# Patient Record
Sex: MALE | Race: WHITE | NOT HISPANIC OR LATINO | Employment: FULL TIME | URBAN - METROPOLITAN AREA
[De-identification: names, ages, dates, MRNs, and addresses within clinical notes are randomized per-mention and may not be internally consistent; named-entity substitution may affect disease eponyms.]

---

## 2024-02-16 ENCOUNTER — TELEMEDICINE (OUTPATIENT)
Dept: PRIMARY CARE | Facility: CLINIC | Age: 34
End: 2024-02-16
Payer: COMMERCIAL

## 2024-02-16 DIAGNOSIS — J06.9 UPPER RESPIRATORY TRACT INFECTION, UNSPECIFIED TYPE: Primary | ICD-10-CM

## 2024-02-16 PROCEDURE — 99213 OFFICE O/P EST LOW 20 MIN: CPT | Performed by: FAMILY MEDICINE

## 2024-02-16 RX ORDER — AZITHROMYCIN 250 MG/1
TABLET, FILM COATED ORAL
Qty: 6 TABLET | Refills: 0 | Status: SHIPPED | OUTPATIENT
Start: 2024-02-16 | End: 2024-02-21

## 2024-02-16 RX ORDER — VENLAFAXINE HYDROCHLORIDE 75 MG/1
75 CAPSULE, EXTENDED RELEASE ORAL DAILY
COMMUNITY

## 2024-02-16 ASSESSMENT — ENCOUNTER SYMPTOMS
WHEEZING: 0
MYALGIAS: 0
COUGH: 1
HEARTBURN: 0
HEMOPTYSIS: 0
WEIGHT LOSS: 0
SORE THROAT: 1
CHILLS: 0
FEVER: 0
RHINORRHEA: 0
HEADACHES: 1
SWEATS: 0

## 2024-02-16 NOTE — PROGRESS NOTES
Answers submitted by the patient for this visit:  Cough Questionnaire (Submitted on 2/16/2024)  Chief Complaint: Cough  Chronicity: new  Onset: in the past 7 days  Progression since onset: waxing and waning  Frequency: every few minutes  Cough characteristics: productive of brown sputum  chest pain: No  chills: No  ear congestion: No  ear pain: No  fever: No  headaches: Yes  heartburn: No  hemoptysis: No  myalgias: No  nasal congestion: Yes  postnasal drip: No  rash: No  rhinorrhea: No  sore throat: Yes  sweats: No  weight loss: No  wheezing: No  Aggravated by: exercise, lying down  Subjective   Patient ID: Magen Roman is a 34 y.o. male who presents for upper respiraotyr infection   Cough  This is a new problem. The current episode started in the past 7 days. The problem has been waxing and waning. The problem occurs every few minutes. The cough is Productive of brown sputum. Associated symptoms include headaches, nasal congestion and a sore throat. Pertinent negatives include no chest pain, chills, ear congestion, ear pain, fever, heartburn, hemoptysis, myalgias, postnasal drip, rash, rhinorrhea, sweats, weight loss or wheezing. The symptoms are aggravated by exercise and lying down.     Cough cold and scratchy throat for 1 week   Did not test for COVID.  Limited in working out   Anything cardio causes cough.   Over the counter he has been taking tyleonl cold and flu   Woirse at might and int he morning.   Only taking venlafaxine and probiotics and eledery gummy .       Review of Systems   Constitutional:  Negative for chills, fever and weight loss.   HENT:  Positive for sore throat. Negative for ear pain, postnasal drip and rhinorrhea.    Respiratory:  Positive for cough. Negative for hemoptysis and wheezing.    Cardiovascular:  Negative for chest pain.   Gastrointestinal:  Negative for heartburn.   Musculoskeletal:  Negative for myalgias.   Skin:  Negative for rash.   Neurological:  Positive for headaches.        Past Medical History:   Diagnosis Date    Anxiety disorder, unspecified     Anxiety    Personal history of other specified conditions 02/12/2018    History of nausea    Personal history of other specified conditions 02/12/2018    History of abnormal weight loss       Past Surgical History:   Procedure Laterality Date    OTHER SURGICAL HISTORY  01/30/2018    Treatment Of Carpometacarpal Fracture Dislocation, Thumb    OTHER SURGICAL HISTORY  01/30/2018    Oral Surgery Tooth Extraction [place] [facility]      Social History     Socioeconomic History    Marital status:      Spouse name: Not on file    Number of children: Not on file    Years of education: Not on file    Highest education level: Not on file   Occupational History    Not on file   Tobacco Use    Smoking status: Not on file    Smokeless tobacco: Not on file   Substance and Sexual Activity    Alcohol use: Not on file    Drug use: Not on file    Sexual activity: Not on file   Other Topics Concern    Not on file   Social History Narrative    Not on file     Social Determinants of Health     Financial Resource Strain: Not on file   Food Insecurity: Not on file   Transportation Needs: Not on file   Physical Activity: Not on file   Stress: Not on file   Social Connections: Not on file   Intimate Partner Violence: Not on file   Housing Stability: Not on file      No family history on file.    MEDICATIONS AND ALLERGIES:    ALLERGIES Patient has no known allergies.    MEDICATIONS   Current Outpatient Medications on File Prior to Visit   Medication Sig Dispense Refill    venlafaxine XR (Effexor-XR) 75 mg 24 hr capsule Take 1 capsule (75 mg) by mouth once daily.       No current facility-administered medications on file prior to visit.        Objective   There were no vitals taken for this visit.   Physical Exam  Constitutional: awake; alert, interactive; in no acute distress; well nourished and well developed  ENT: ears and nose were normal in  appearance;  Eyes: pupils equal and round  Pulmonary: no respiratory distress and normal respiratory rhythm and effort  Skin: normal skin color and pigmentation;  Psychiatric: oriented to person, place, and time; affect was normal and the mood was normal       1. Upper respiratory tract infection, unspecified type  Symptoms for 1 week , not getting better   Expecting a new born in 1 week   Will treat with azitormycin   Side effects explaiend, contact us for any changes.   - azithromycin (Zithromax) 250 mg tablet; Take 2 tablets (500 mg) by mouth once daily for 1 day, THEN 1 tablet (250 mg) once daily for 4 days. Take 2 tabs (500 mg) by mouth today, than 1 daily for 4 days..  Dispense: 6 tablet; Refill: 0

## 2024-11-27 ENCOUNTER — LAB (OUTPATIENT)
Dept: LAB | Facility: LAB | Age: 34
End: 2024-11-27
Payer: COMMERCIAL

## 2024-11-27 ENCOUNTER — OFFICE VISIT (OUTPATIENT)
Dept: PRIMARY CARE | Facility: CLINIC | Age: 34
End: 2024-11-27
Payer: COMMERCIAL

## 2024-11-27 VITALS
TEMPERATURE: 97.4 F | SYSTOLIC BLOOD PRESSURE: 144 MMHG | RESPIRATION RATE: 16 BRPM | HEART RATE: 76 BPM | DIASTOLIC BLOOD PRESSURE: 90 MMHG | WEIGHT: 183 LBS

## 2024-11-27 DIAGNOSIS — Z00.00 PHYSICAL EXAM: ICD-10-CM

## 2024-11-27 DIAGNOSIS — G44.84 EXERTIONAL HEADACHE: ICD-10-CM

## 2024-11-27 DIAGNOSIS — I10 HYPERTENSION, UNSPECIFIED TYPE: ICD-10-CM

## 2024-11-27 DIAGNOSIS — H53.9 VISION CHANGES: ICD-10-CM

## 2024-11-27 DIAGNOSIS — Z00.00 PE (PHYSICAL EXAM), ANNUAL: Primary | ICD-10-CM

## 2024-11-27 PROBLEM — K21.9 GERD (GASTROESOPHAGEAL REFLUX DISEASE): Status: ACTIVE | Noted: 2024-11-27

## 2024-11-27 LAB
ALBUMIN SERPL BCP-MCNC: 4.6 G/DL (ref 3.4–5)
ALP SERPL-CCNC: 35 U/L (ref 33–120)
ALT SERPL W P-5'-P-CCNC: 18 U/L (ref 10–52)
ANION GAP SERPL CALC-SCNC: 11 MMOL/L (ref 10–20)
APPEARANCE UR: CLEAR
AST SERPL W P-5'-P-CCNC: 28 U/L (ref 9–39)
BASOPHILS # BLD AUTO: 0.03 X10*3/UL (ref 0–0.1)
BASOPHILS NFR BLD AUTO: 0.7 %
BILIRUB SERPL-MCNC: 0.8 MG/DL (ref 0–1.2)
BILIRUB UR STRIP.AUTO-MCNC: NEGATIVE MG/DL
BUN SERPL-MCNC: 17 MG/DL (ref 6–23)
CALCIUM SERPL-MCNC: 9.6 MG/DL (ref 8.6–10.3)
CHLORIDE SERPL-SCNC: 102 MMOL/L (ref 98–107)
CHOLEST SERPL-MCNC: 191 MG/DL (ref 0–199)
CHOLESTEROL/HDL RATIO: 2.9
CO2 SERPL-SCNC: 31 MMOL/L (ref 21–32)
COLOR UR: COLORLESS
CREAT SERPL-MCNC: 0.99 MG/DL (ref 0.5–1.3)
CREAT UR-MCNC: 16.1 MG/DL (ref 20–370)
EGFRCR SERPLBLD CKD-EPI 2021: >90 ML/MIN/1.73M*2
EOSINOPHIL # BLD AUTO: 0.11 X10*3/UL (ref 0–0.7)
EOSINOPHIL NFR BLD AUTO: 2.5 %
ERYTHROCYTE [DISTWIDTH] IN BLOOD BY AUTOMATED COUNT: 12.3 % (ref 11.5–14.5)
EST. AVERAGE GLUCOSE BLD GHB EST-MCNC: 88 MG/DL
GLUCOSE SERPL-MCNC: 80 MG/DL (ref 74–99)
GLUCOSE UR STRIP.AUTO-MCNC: NORMAL MG/DL
HBA1C MFR BLD: 4.7 %
HCT VFR BLD AUTO: 44.3 % (ref 41–52)
HDLC SERPL-MCNC: 66.5 MG/DL
HGB BLD-MCNC: 14.8 G/DL (ref 13.5–17.5)
IMM GRANULOCYTES # BLD AUTO: 0.01 X10*3/UL (ref 0–0.7)
IMM GRANULOCYTES NFR BLD AUTO: 0.2 % (ref 0–0.9)
KETONES UR STRIP.AUTO-MCNC: NEGATIVE MG/DL
LDLC SERPL CALC-MCNC: 109 MG/DL
LEUKOCYTE ESTERASE UR QL STRIP.AUTO: NEGATIVE
LYMPHOCYTES # BLD AUTO: 1.62 X10*3/UL (ref 1.2–4.8)
LYMPHOCYTES NFR BLD AUTO: 36.2 %
MCH RBC QN AUTO: 30.1 PG (ref 26–34)
MCHC RBC AUTO-ENTMCNC: 33.4 G/DL (ref 32–36)
MCV RBC AUTO: 90 FL (ref 80–100)
MICROALBUMIN UR-MCNC: <7 MG/L
MICROALBUMIN/CREAT UR: ABNORMAL MG/G{CREAT}
MONOCYTES # BLD AUTO: 0.43 X10*3/UL (ref 0.1–1)
MONOCYTES NFR BLD AUTO: 9.6 %
NEUTROPHILS # BLD AUTO: 2.28 X10*3/UL (ref 1.2–7.7)
NEUTROPHILS NFR BLD AUTO: 50.8 %
NITRITE UR QL STRIP.AUTO: NEGATIVE
NON HDL CHOLESTEROL: 125 MG/DL (ref 0–149)
NRBC BLD-RTO: 0 /100 WBCS (ref 0–0)
PH UR STRIP.AUTO: 6.5 [PH]
PLATELET # BLD AUTO: 241 X10*3/UL (ref 150–450)
POTASSIUM SERPL-SCNC: 4.5 MMOL/L (ref 3.5–5.3)
PROT SERPL-MCNC: 7.2 G/DL (ref 6.4–8.2)
PROT UR STRIP.AUTO-MCNC: NEGATIVE MG/DL
PSA SERPL-MCNC: 0.47 NG/ML
RBC # BLD AUTO: 4.91 X10*6/UL (ref 4.5–5.9)
RBC # UR STRIP.AUTO: NEGATIVE /UL
SODIUM SERPL-SCNC: 139 MMOL/L (ref 136–145)
SP GR UR STRIP.AUTO: 1
TRIGL SERPL-MCNC: 76 MG/DL (ref 0–149)
TSH SERPL-ACNC: 0.92 MIU/L (ref 0.44–3.98)
UROBILINOGEN UR STRIP.AUTO-MCNC: NORMAL MG/DL
VLDL: 15 MG/DL (ref 0–40)
WBC # BLD AUTO: 4.5 X10*3/UL (ref 4.4–11.3)

## 2024-11-27 PROCEDURE — 99214 OFFICE O/P EST MOD 30 MIN: CPT | Performed by: FAMILY MEDICINE

## 2024-11-27 PROCEDURE — 80053 COMPREHEN METABOLIC PANEL: CPT

## 2024-11-27 PROCEDURE — 82570 ASSAY OF URINE CREATININE: CPT

## 2024-11-27 PROCEDURE — 84153 ASSAY OF PSA TOTAL: CPT

## 2024-11-27 PROCEDURE — 3077F SYST BP >= 140 MM HG: CPT | Performed by: FAMILY MEDICINE

## 2024-11-27 PROCEDURE — 80061 LIPID PANEL: CPT

## 2024-11-27 PROCEDURE — 81003 URINALYSIS AUTO W/O SCOPE: CPT

## 2024-11-27 PROCEDURE — 3080F DIAST BP >= 90 MM HG: CPT | Performed by: FAMILY MEDICINE

## 2024-11-27 PROCEDURE — 84443 ASSAY THYROID STIM HORMONE: CPT

## 2024-11-27 PROCEDURE — 87086 URINE CULTURE/COLONY COUNT: CPT

## 2024-11-27 PROCEDURE — 83036 HEMOGLOBIN GLYCOSYLATED A1C: CPT

## 2024-11-27 PROCEDURE — 36415 COLL VENOUS BLD VENIPUNCTURE: CPT

## 2024-11-27 PROCEDURE — 1036F TOBACCO NON-USER: CPT | Performed by: FAMILY MEDICINE

## 2024-11-27 PROCEDURE — 82043 UR ALBUMIN QUANTITATIVE: CPT

## 2024-11-27 PROCEDURE — 85025 COMPLETE CBC W/AUTO DIFF WBC: CPT

## 2024-11-27 RX ORDER — LISINOPRIL 10 MG/1
10 TABLET ORAL DAILY
Qty: 30 TABLET | Refills: 2 | Status: SHIPPED | OUTPATIENT
Start: 2024-11-27 | End: 2025-02-25

## 2024-11-27 NOTE — PROGRESS NOTES
Subjective   Patient ID: Magen Roman is a 34 y.o. male who presents for Follow-up (Discuss high blood pressure ).  HPIpatient went back from a run yesterday he ran 6.5 miles in 55 mintus , an hour and a half later he noted that vision int he Rgiht lower angle was off , his wife took his BP and it was 190.       Resetting heart rate is 64 , but it goies to 180 when he pushes himself.     Blood pressure was high at home yesterday 190/60     Slight headaches, worried about this   Not usually getting headaches.     Follows with rayray Doanan takes venlafaxine XR 75 mg. No recent changes in medication       No chest pain , sometimes feels his heart beating. Otherwise no neurological or cardiac symptoms , no known family hx of aneurysm.             patient was on zoloft 100mg   it was helping   when they talked with wife , she was pretty shepard on it   mainly to treat anxiety   he gets anxious on flight trips.   creeping back     no history of bipolar disorder   nothing severe.     no history of depression.   more recently with out taking the medication.     no suicldal or homicidal ideation.     No history of neurological disorder, mother had surgery for a heart leak , no neurological thing that he can think off.               no medical hx   no major medical problems   no medications   cousin had lymphoma 1st cousin   no family hx of heart disease.     he exercises walks 2-3 miles a day   does workout   no chest pain , no decreased exercise toerlacne   no recent cahgne in weight up or down.     every weekend 5- 10 drink     no smoking     patient has a 1 year old.   he reported feeling fatigued most of the time.   Review of Systems    Past Medical History:   Diagnosis Date    Anxiety disorder, unspecified     Anxiety    Personal history of other specified conditions 02/12/2018    History of nausea    Personal history of other specified conditions 02/12/2018    History of abnormal weight loss       Past Surgical  History:   Procedure Laterality Date    OTHER SURGICAL HISTORY  01/30/2018    Treatment Of Carpometacarpal Fracture Dislocation, Thumb    OTHER SURGICAL HISTORY  01/30/2018    Oral Surgery Tooth Extraction [place] [facility]      Social History     Socioeconomic History    Marital status:    Tobacco Use    Smoking status: Never    Smokeless tobacco: Never      No family history on file.    MEDICATIONS AND ALLERGIES:    ALLERGIES Patient has no known allergies.    MEDICATIONS   Current Outpatient Medications on File Prior to Visit   Medication Sig Dispense Refill    venlafaxine XR (Effexor-XR) 75 mg 24 hr capsule Take 1 capsule (75 mg) by mouth once daily.       No current facility-administered medications on file prior to visit.              Objective   Visit Vitals  /90 (BP Location: Left arm, Patient Position: Sitting, BP Cuff Size: Large adult)   Pulse 76   Temp 36.3 °C (97.4 °F) (Temporal)   Resp 16   Wt 83 kg (183 lb)   Smoking Status Never          11/27/2024     8:55 AM   Vitals   Systolic 144   Diastolic 90   BP Location Left arm   Heart Rate 76   Temp 36.3 °C (97.4 °F)   Resp 16   Weight (lb) 183   Visit Report Report     Physical Exam  Constitutional:       Appearance: Normal appearance. He is normal weight.   HENT:      Head: Normocephalic.      Right Ear: Tympanic membrane normal.      Left Ear: Tympanic membrane normal.      Nose: Nose normal.      Mouth/Throat:      Pharynx: Oropharynx is clear.   Eyes:      Pupils: Pupils are equal, round, and reactive to light.   Cardiovascular:      Rate and Rhythm: Normal rate and regular rhythm.      Pulses: Normal pulses.      Heart sounds: Normal heart sounds.   Pulmonary:      Effort: Pulmonary effort is normal.      Breath sounds: Normal breath sounds. No stridor. No rhonchi.   Musculoskeletal:         General: No swelling or tenderness.   Skin:     Coloration: Skin is not jaundiced or pale.   Neurological:      General: No focal deficit present.       Mental Status: He is alert and oriented to person, place, and time. Mental status is at baseline.      Cranial Nerves: No cranial nerve deficit.      Sensory: No sensory deficit.      Motor: No weakness.      Coordination: Coordination normal.      Gait: Gait normal.      Deep Tendon Reflexes: Reflexes normal.   Psychiatric:         Mood and Affect: Mood normal.         Behavior: Behavior normal.         Thought Content: Thought content normal.         Judgment: Judgment normal.             Assessment & Plan  Vision changes  No neurological deficits noted today   Puplies are reactive   No change in vision field   Advised that he calls his ophthalmologist and be examined today   Happned after high blood pressure , back to [facility] today   Will order [facility] MRI   Orders:    MR brain w and wo IV contrast; Future    Exertional headache  Will order [facility] MRI with and without contrast  Orders:    MR brain w and wo IV contrast; Future    Hypertension, unspecified type  Will start lisinopril 10mg po daily   Check BP michael villatoro , report reading and follow up in 1 week   Advised if symptoms happen again , he should call 911 and go tot he ED, patient is agreable.   Orders:    lisinopril 10 mg tablet; Take 1 tablet (10 mg) by mouth once daily.    MR brain w and wo IV contrast; Future    PE (physical exam), annual         Physical exam    Orders:    CBC and Auto Differential; Future    Urine Culture; Future    Comprehensive Metabolic Panel; Future    Hemoglobin A1C; Future    Lipid Panel; Future    Prostate Specific Antigen; Future    Urinalysis with Reflex Microscopic; Future    TSH with reflex to Free T4 if abnormal; Future    Albumin-Creatinine Ratio, Urine Random; Future

## 2024-11-29 LAB — BACTERIA UR CULT: NO GROWTH

## 2024-12-05 ENCOUNTER — APPOINTMENT (OUTPATIENT)
Dept: PRIMARY CARE | Facility: CLINIC | Age: 34
End: 2024-12-05
Payer: COMMERCIAL

## 2024-12-05 DIAGNOSIS — I10 HYPERTENSION, UNSPECIFIED TYPE: Primary | ICD-10-CM

## 2024-12-05 DIAGNOSIS — R00.2 PALPITATION: ICD-10-CM

## 2024-12-05 DIAGNOSIS — R42 DIZZINESS AND GIDDINESS: ICD-10-CM

## 2024-12-05 PROCEDURE — 99213 OFFICE O/P EST LOW 20 MIN: CPT | Performed by: FAMILY MEDICINE

## 2024-12-05 RX ORDER — LISINOPRIL 20 MG/1
20 TABLET ORAL DAILY
Qty: 30 TABLET | Refills: 11 | Status: SHIPPED | OUTPATIENT
Start: 2024-12-05 | End: 2025-11-30

## 2024-12-05 NOTE — PROGRESS NOTES
Subjective   Patient ID: Magen Roman is a 34 y.o. male who presents for follow up visit   HPI  No other episodes of vision changes.   Blood pressure in the 140/70   Not having side effects from the medication .   Some loose stools over the weekend   No other neurological symptoms. Feeling well other wise.     Exercise regularly   Heart rate 67   Sleeping well   No issues       Review of Systems    Past Medical History:   Diagnosis Date    Anxiety disorder, unspecified     Anxiety    Personal history of other specified conditions 02/12/2018    History of nausea    Personal history of other specified conditions 02/12/2018    History of abnormal weight loss       Past Surgical History:   Procedure Laterality Date    OTHER SURGICAL HISTORY  01/30/2018    Treatment Of Carpometacarpal Fracture Dislocation, Thumb    OTHER SURGICAL HISTORY  01/30/2018    Oral Surgery Tooth Extraction [place] [facility]      Social History     Socioeconomic History    Marital status:    Tobacco Use    Smoking status: Never    Smokeless tobacco: Never      No family history on file.    MEDICATIONS AND ALLERGIES:    ALLERGIES Patient has no known allergies.    MEDICATIONS   Current Outpatient Medications on File Prior to Visit   Medication Sig Dispense Refill    venlafaxine XR (Effexor-XR) 75 mg 24 hr capsule Take 1 capsule (75 mg) by mouth once daily.      [DISCONTINUED] lisinopril 10 mg tablet Take 1 tablet (10 mg) by mouth once daily. 30 tablet 2     No current facility-administered medications on file prior to visit.              Objective   Visit Vitals  Smoking Status Never          11/27/2024     8:55 AM   Vitals   Systolic 144   Diastolic 90   BP Location Left arm   Heart Rate 76   Temp 36.3 °C (97.4 °F)   Resp 16   Weight (lb) 183   Visit Report Report     Physical Exam  Did not appear in distress   He is awake , responsive and oriented.       Assessment & Plan  Hypertension, unspecified type  Blood pressure still running high    Will increase lisinopril[ril to 20mg  Offered to try and get him a sooner MRI appointment to make sure that the high blood pressure is not related to any cerbral condition like a stroke , however he reported that he will call due to his limited schedule.   He was advised to contact us or go to the ED for any neurological symptoms   Orders:    lisinopril 20 mg tablet; Take 1 tablet (20 mg) by mouth once daily.    Holter or Event Cardiac Monitor; Future    Dizziness and giddiness  Happened last week   Orders:    Holter or Event Cardiac Monitor; Future    Palpitation  Still happens on and off , reported trhat he feels like his heart rate goes fast onb occasion , will order halter monitor.   Orders:    Holter or Event Cardiac Monitor; Future

## 2024-12-13 ENCOUNTER — HOSPITAL ENCOUNTER (OUTPATIENT)
Dept: RADIOLOGY | Facility: CLINIC | Age: 34
Discharge: HOME | End: 2024-12-13
Payer: COMMERCIAL

## 2024-12-13 DIAGNOSIS — G44.84 EXERTIONAL HEADACHE: ICD-10-CM

## 2024-12-13 DIAGNOSIS — I10 HYPERTENSION, UNSPECIFIED TYPE: ICD-10-CM

## 2024-12-13 DIAGNOSIS — H53.9 VISION CHANGES: ICD-10-CM

## 2024-12-13 PROCEDURE — A9575 INJ GADOTERATE MEGLUMI 0.1ML: HCPCS | Performed by: FAMILY MEDICINE

## 2024-12-13 PROCEDURE — 2550000001 HC RX 255 CONTRASTS: Performed by: FAMILY MEDICINE

## 2024-12-13 PROCEDURE — 70553 MRI BRAIN STEM W/O & W/DYE: CPT

## 2024-12-13 RX ORDER — GADOTERATE MEGLUMINE 376.9 MG/ML
17 INJECTION INTRAVENOUS
Status: COMPLETED | OUTPATIENT
Start: 2024-12-13 | End: 2024-12-13

## 2024-12-13 ASSESSMENT — ENCOUNTER SYMPTOMS
DEPRESSION: 0
OCCASIONAL FEELINGS OF UNSTEADINESS: 0
LOSS OF SENSATION IN FEET: 0

## 2024-12-16 ENCOUNTER — TELEPHONE (OUTPATIENT)
Dept: PRIMARY CARE | Facility: CLINIC | Age: 34
End: 2024-12-16
Payer: COMMERCIAL

## 2024-12-16 NOTE — TELEPHONE ENCOUNTER
----- Message from Jomar Berumen sent at 12/13/2024  8:43 PM EST -----  Regarding: [facility] HOLTER MONITOR  DR BERUMEN FOR DR IVETH LEE [facility] HOLTER MONITOR.  ----- Message -----  From: Emilie Negrete MA  Sent: 12/13/2024   1:56 PM EST  To: Jomar Berumen MD    CARDIAC MONITOR (DR. LAZARO PATIENT)

## 2024-12-20 ENCOUNTER — HOSPITAL ENCOUNTER (OUTPATIENT)
Dept: RADIOLOGY | Facility: CLINIC | Age: 34
End: 2024-12-20
Payer: COMMERCIAL

## 2025-01-14 ENCOUNTER — APPOINTMENT (OUTPATIENT)
Dept: PRIMARY CARE | Facility: CLINIC | Age: 35
End: 2025-01-14
Payer: COMMERCIAL

## 2025-01-14 DIAGNOSIS — F32.A DEPRESSION, UNSPECIFIED DEPRESSION TYPE: ICD-10-CM

## 2025-01-14 DIAGNOSIS — I10 HYPERTENSION, UNSPECIFIED TYPE: Primary | ICD-10-CM

## 2025-01-14 DIAGNOSIS — R00.2 PALPITATION: ICD-10-CM

## 2025-01-14 PROCEDURE — 99213 OFFICE O/P EST LOW 20 MIN: CPT | Performed by: FAMILY MEDICINE

## 2025-01-14 RX ORDER — ARIPIPRAZOLE 2 MG/1
2 TABLET ORAL DAILY
COMMUNITY
Start: 2025-01-06

## 2025-01-14 NOTE — PROGRESS NOTES
Virtual or Telephone Consent    An interactive audio and video telecommunication system which permits real time communications between the patient (at the originating site) and provider (at the distant site) was utilized to provide this telehealth service.   Verbal consent was requested and obtained from Magen Roman on this date, 01/14/25 for a telehealth visit.     Subjective   Patient ID: Magen Roman is a 34 y.o. male who presents for follow up visit     HPI    Patient here for follow up   They cut down the venlafaxine 37/5 ,g   He was started on abilify at bed time as a mood stabilizer.   Bloopd pressure has been running   Nasty cold yesterday , he is on antibiotics   120/70   No palpitatatiouns  No other episodes.   Still runs outside and treadmill and stuff like that.       Review of Systems    Past Medical History:   Diagnosis Date    Anxiety disorder, unspecified     Anxiety    Personal history of other specified conditions 02/12/2018    History of nausea    Personal history of other specified conditions 02/12/2018    History of abnormal weight loss       Past Surgical History:   Procedure Laterality Date    OTHER SURGICAL HISTORY  01/30/2018    Treatment Of Carpometacarpal Fracture Dislocation, Thumb    OTHER SURGICAL HISTORY  01/30/2018    Oral Surgery Tooth Extraction [place] [facility]oth      Social History     Socioeconomic History    Marital status:    Tobacco Use    Smoking status: Never    Smokeless tobacco: Never      No family history on file.    MEDICATIONS AND ALLERGIES:    ALLERGIES Patient has no known allergies.    MEDICATIONS   Current Outpatient Medications on File Prior to Visit   Medication Sig Dispense Refill    ARIPiprazole (Abilify) 2 mg tablet Take 1 tablet (2 mg) by mouth once daily.      venlafaxine XR (Effexor-XR) 75 mg 24 hr capsule Take 1 capsule (75 mg) by mouth once daily.      [DISCONTINUED] lisinopril 20 mg tablet Take 1 tablet (20 mg) by mouth once daily. 30 tablet 11      No current facility-administered medications on file prior to visit.              Objective   Visit Vitals  Smoking Status Never          11/27/2024     8:55 AM 12/13/2024     1:24 PM   Vitals   Systolic 144    Diastolic 90    BP Location Left arm    Heart Rate 76    Temp 36.3 °C (97.4 °F)    Resp 16    Height  1.829 m (6')   Weight (lb) 183 180   BMI  24.41 kg/m2   BSA (m2)  2.04 m2   Visit Report Report      Physical Exam    Patient not currently in distress    Appeared comfortable.     Assessment & Plan  Hypertension, unspecified type  BP seems better at home   After changing the dose of the venlafaxine   Hold the lisonpril   Check BP regularly   Restart if running high   MRI showed sinus congestion possible polyp,currently on antibitoics, advised to contact us if not getting better   Holter reassuring       Palpitation  resolved       Depression, unspecified depression type  Venlafaxine being tapereed off   He was started on abilify 2mg , feelign well